# Patient Record
Sex: FEMALE | Race: ASIAN
[De-identification: names, ages, dates, MRNs, and addresses within clinical notes are randomized per-mention and may not be internally consistent; named-entity substitution may affect disease eponyms.]

---

## 2017-11-15 ENCOUNTER — HOSPITAL ENCOUNTER (EMERGENCY)
Dept: HOSPITAL 74 - JER | Age: 82
Discharge: HOME | End: 2017-11-15
Payer: COMMERCIAL

## 2017-11-15 VITALS — HEART RATE: 95 BPM | SYSTOLIC BLOOD PRESSURE: 167 MMHG | TEMPERATURE: 98.2 F | DIASTOLIC BLOOD PRESSURE: 85 MMHG

## 2017-11-15 VITALS — BODY MASS INDEX: 15 KG/M2
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
## 2017-11-15 DIAGNOSIS — S09.90XA: ICD-10-CM
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
## 2017-11-15 DIAGNOSIS — G20: ICD-10-CM

## 2017-11-15 DIAGNOSIS — S01.01XA: Primary | ICD-10-CM

## 2017-11-15 PROCEDURE — 0HQ0XZZ REPAIR SCALP SKIN, EXTERNAL APPROACH: ICD-10-PCS

## 2017-11-15 NOTE — PDOC
Rapid Medical Evaluation


Time Seen by Provider: 11/15/17 18:40


Medical Evaluation: 





11/15/17 18:40





I have performed a brief in-person evaluation of this patient.


The patient presents with a chief complaint of: Slipped and fell hitting head 

tonight, not on any blood thinners. No LOC, HA, dizziness, n/v. Also c/o b/l 

hip pain and has not attempted to ambulate since injury. H/o PD, ambulates w/ 

walker


Pertinent physical exam findings:Stable and in NAD w/ dried matted blood to 

occipital area, no obvious LE deformity, non-focal


I have ordered the following:CT head and hip xrays


The patient will proceed to the ED for further evaluation.

## 2017-11-15 NOTE — PDOC
History of Present Illness





- General


History Source: Patient, Family (Son), Significant Other ()





- History of Present Illness


Initial Comments: 





11/15/17 20:45


The patient is a 79 year old female, with a significant past medical history of 

Parkinsons Disease, who presents to the emergency department s/p unwitnessed 

fall today. Patient was reportedly in the kitchen and fell backwards landing on 

the floor. Patients  and son at bedside report the patient hit the back 

of her head. Upon evaluation, patient reports pain to legs. Patient denies any 

nausea, vomiting or headache. 





She denies chest pain, SOB or dizziness. She denies fever, chills, abdominal 

pain, nausea, vomit, diarrhea or constipation. She denies dysuria, frequency, 

urgency or hematuria. Patient denies sick contacts or recent travel. 





Allergies: NKA


Past surgical history: None


Social history:  None


PCP: Dr. DENNIS Alejandro 











<Kristin Gonzalez - Last Filed: 11/15/17 23:06>





<Brent Denis - Last Filed: 11/15/17 23:11>





- General


Chief Complaint: Injury


Stated Complaint: FALL INJURY


Time Seen by Provider: 11/15/17 18:40





Past History





<Kristin Gonzalez - Last Filed: 11/15/17 23:06>





- Past Medical History


COPD: No


Other medical history: PARKINSONS





- Suicide/Smoking/Psychosocial Hx


Smoking History: Never smoked


Hx Alcohol Use: No


Drug/Substance Use Hx: No





<Brent Denis - Last Filed: 11/15/17 23:11>





- Past Medical History


Allergies/Adverse Reactions: 


 Allergies











Allergy/AdvReac Type Severity Reaction Status Date / Time


 


No Known Allergies Allergy   Verified 11/15/17 18:44














**Review of Systems





- Review of Systems


Able to Perform ROS?: Yes


Comments:: 





11/15/17 20:45





CONSTITUTIONAL: No fever, no chills, no fatigue


EYES: No visual changes


ENT: No ear pain, no sore throat


CARDIOVASCULAR: No chest pain, no palpitations


RESPIRATORY: No cough, no SOB


GI: No abdominal pain, no nausea, no vomiting, no constipation, no diarrhea


GENITOURINARY: No dysuria, no frequency, no hematuria


MUSKULOSKELETAL: No back pain, no joint pain, no myalgias


SKIN: +head laceration. No rash


NEURO: No headache











<Kristin Gonzalez - Last Filed: 11/15/17 23:06>





*Physical Exam





- Vital Signs


 Last Vital Signs











Temp Pulse Resp BP Pulse Ox


 


 98.2 F   95 H  20   167/85   95 


 


 11/15/17 18:40  11/15/17 18:40  11/15/17 18:40  11/15/17 18:40  11/15/17 18:40














- Physical Exam


Comments: 





11/15/17 20:45





CONSTITUTIONAL: Well-appearing; well-nourished; in no apparent distress


HEAD: Normocephalic; +0.5 laceration to the back of head. 


EYES: PERRL; EOM intact. +Limited upward gaze


ENMT: External appears normal; normal oropharynx


NECK: Supple; nontender; no cervical lymphadenopathy


CARD: Normal S1, S2; no murmurs, rubs, or gallops


RESP: Normal chest excursion with respiration; breath sounds clear and equal 

bilaterally; no wheezes, rhonchi, or rales


ABD: Soft, non-distended; non-tender; no palpable organomegaly, no palpable 

hernias


EXT: Normal ROM in all four extremities; non-tender to palpation; distal pulses 

intact. No bony crepitus. No step offs.


SKIN: Warm, dry, no rash


NEURO: No focal neurological deficiencies.+Resting tremor with co-glial 

rigidity. 











<Kristin Gonzalez - Last Filed: 11/15/17 23:06>





- Vital Signs


 Last Vital Signs











Temp Pulse Resp BP Pulse Ox


 


 98.2 F   95 H  20   167/85   95 


 


 11/15/17 18:40  11/15/17 18:40  11/15/17 18:40  11/15/17 18:40  11/15/17 18:40














<Brent Denis - Last Filed: 11/15/17 23:11>





Procedures





- Laceration/Wound Repair


  ** Posterior Occipital


Wound Length: to 2.5 cm


Wound Explored: clean


Wound's Depth, Shape: superficial


Irrigated w/ Saline: Yes


Betadine Prep: No


Wound Debrided: minimal


Wound Repaired With: Staples


Number of Sutures: 3


Sterile Dressing Applied: No


Splint Applied: No


Sling Applied: No


Progress: 





11/15/17 23:10


pt susannah procedure well





<Brent Denis - Last Filed: 11/15/17 23:11>





ED Treatment Course





- RADIOLOGY


Radiology Studies Ordered: 














 Category Date Time Status


 


 CERVICAL SPINE CT W/O CONTR [CT] Stat CT Scan  11/15/17 20:08 Taken














<Brent Denis - Last Filed: 11/15/17 23:11>





Medical Decision Making





- Medical Decision Making





11/15/17 23:07


Patient is a 79-year-old female with history of Parkinson's disease who 

presented to the ER with a small occipital scalp laceration after mechanical 

fall at home. No LOC was reported. CT of head and cervical spine reveals no 

evidence of acute traumatic pathology. Thyroid nodule was noted. Patient's 

pelvic and hip x-rays also revealed no evidence of fracture dislocation. LS-

spine x-ray revealed a compression fracture of L1 which is likely chronic in 

nature as patient has no L1 tenderness and evaluation. I discussed the x-ray 

and CT findings with patient's family. And advised him of the need for 

outpatient follow-up. They've expressed understanding.


11/15/17 23:08


Laceration was repaired primarily using 3 staples. Patient tolerated procedure 

well. Bacitracin applied topically.





<Brent Denis - Last Filed: 11/15/17 23:11>





*DC/Admit/Observation/Transfer





- Attestations


Scribe Attestion: 





11/15/17 20:45





Documentation prepared by Kristin Gonzalez, acting as medical scribe for Brent Denis MD





<Kristin Gonzalez - Last Filed: 11/15/17 23:06>





- Attestations


Physician Attestion: 





11/15/17 23:07


The documentation was prepared by the scribe under my direct supervision. I 

have reviewed the documentation which correctly represents the findings, 

medical decision-making and critical action taken by me.





<Brent Denis - Last Filed: 11/15/17 23:11>


Diagnosis at time of Disposition: 


Occipital scalp laceration


Qualifiers:


 Encounter type: initial encounter Qualified Code(s): S01.01XA - Laceration 

without foreign body of scalp, initial encounter





Head injury due to trauma


Qualifiers:


 Encounter type: initial encounter Qualified Code(s): S09.90XA - Unspecified 

injury of head, initial encounter








- Discharge Dispostion


Disposition: HOME


Condition at time of disposition: Stable





- Referrals


Referrals: 


Cliff Alejandro MD [Primary Care Provider] - 





- Patient Instructions


Printed Discharge Instructions:  DI for Closed Head Injury, DI for Laceration 

Repair -- [facility]


Additional Instructions: 


Please follow-up with your primary care physician for evaluation of thyroid 

nodule and the evaluation of the compression fracture of lumbar vertebra L1. 

Return immediately for severe pain, worsening symptoms.





- Post Discharge Activity

## 2017-11-26 ENCOUNTER — HOSPITAL ENCOUNTER (EMERGENCY)
Dept: HOSPITAL 74 - JERFT | Age: 82
Discharge: HOME | End: 2017-11-26
Payer: COMMERCIAL

## 2017-11-26 VITALS — BODY MASS INDEX: 15 KG/M2

## 2017-11-26 VITALS — HEART RATE: 102 BPM | SYSTOLIC BLOOD PRESSURE: 126 MMHG | TEMPERATURE: 98.1 F | DIASTOLIC BLOOD PRESSURE: 70 MMHG

## 2017-11-26 DIAGNOSIS — Z48.02: Primary | ICD-10-CM

## 2017-11-26 NOTE — PDOC
Suture Removal/Wound Check HPI





- History of Present Illness


Chief Complaint: Suture/Staple Removal(Here)


Stated Complaint: SUTURE/STAPLE REMOVAL


Time Seen by Provider: 11/26/17 11:29


History Source: Yes: Patient, Family


Exam Limitations: Yes: No Limitations


Treated at: [facility] ED





- Previous ED Treatment


Type of procedure performed on last visit: Yes: Laceration Repair


Tetanus Immunization: Yes: Up to Date


Antibiotics Prescribed: No





Past History





- Travel


Traveled outside of the country in the last 30 days: No


Close contact w/someone who was outside of country & ill: No





- Past Medical History


Allergies/Adverse Reactions: 


 Allergies











Allergy/AdvReac Type Severity Reaction Status Date / Time


 


No Known Allergies Allergy   Verified 11/26/17 11:12











CVA: No


COPD: No





- Immunization History


Immunization Up to Date: Yes





- Suicide/Smoking/Psychosocial Hx


Smoking History: Never smoked


Have you smoked in the past 12 months: No


Information on smoking cessation initiated: No


Hx Alcohol Use: No


Drug/Substance Use Hx: No


Substance Use Type: None





Suture Removal/Wound Check PE





- Physical Exam


Laceration/Wound Check Symptoms: reports: None


Current Severity Level: None


Maximum Severity Level: None


Location of Laceration/Wound: right: Head (occiput- 3 staples intact - wound 

approx well)





*Review of Systems





- Review of Systems


Able to Perform ROS?: Yes


Constitutional: Yes: See HPI.  No: Symptoms Reported


HEENTM: Yes: See HPI.  No: Symptoms Reported


Integumentary: Yes: Symptoms Reported, See HPI, Bruising


All Other Systems: Reviewed and Negative





Medical Decision Making





- Medical Decision Making





11/26/17 12:32


3 staples removed wihtout incident 





*DC/Admit/Observation/Transfer


Diagnosis at time of Disposition: 


 Encounter for staple removal








- Discharge Dispostion


Disposition: HOME


Condition at time of disposition: Stable


Admit: No





- Referrals


Referrals: 


Cliff Alejandro MD [Primary Care Provider] - 





- Patient Instructions





- Post Discharge Activity

## 2018-09-19 ENCOUNTER — HOSPITAL ENCOUNTER (EMERGENCY)
Dept: HOSPITAL 74 - JER | Age: 83
Discharge: ANOTHER HOSPITAL | End: 2018-09-19
Payer: COMMERCIAL

## 2018-09-19 VITALS — BODY MASS INDEX: 21.5 KG/M2

## 2018-09-19 VITALS — TEMPERATURE: 97.9 F

## 2018-09-19 VITALS — DIASTOLIC BLOOD PRESSURE: 115 MMHG | HEART RATE: 97 BPM | SYSTOLIC BLOOD PRESSURE: 183 MMHG

## 2018-09-19 DIAGNOSIS — I61.9: Primary | ICD-10-CM

## 2018-09-19 DIAGNOSIS — G20: ICD-10-CM

## 2018-09-19 DIAGNOSIS — R55: ICD-10-CM

## 2018-09-19 LAB
ALBUMIN SERPL-MCNC: 3.4 G/DL (ref 3.4–5)
ALP SERPL-CCNC: 82 U/L (ref 45–117)
ALT SERPL-CCNC: 8 U/L (ref 13–61)
ANION GAP SERPL CALC-SCNC: 12 MMOL/L (ref 8–16)
AST SERPL-CCNC: 18 U/L (ref 15–37)
BASOPHILS # BLD: 1 % (ref 0–2)
BILIRUB SERPL-MCNC: 0.7 MG/DL (ref 0.2–1)
BUN SERPL-MCNC: 26 MG/DL (ref 7–18)
CALCIUM SERPL-MCNC: 8.7 MG/DL (ref 8.5–10.1)
CHLORIDE SERPL-SCNC: 106 MMOL/L (ref 98–107)
CHOLEST SERPL-MCNC: 220 MG/DL (ref 50–200)
CO2 SERPL-SCNC: 24 MMOL/L (ref 21–32)
CREAT SERPL-MCNC: 1 MG/DL (ref 0.55–1.3)
DEPRECATED RDW RBC AUTO: 15.2 % (ref 11.6–15.6)
EOSINOPHIL # BLD: 1.3 % (ref 0–4.5)
GLUCOSE SERPL-MCNC: 118 MG/DL (ref 74–106)
HCT VFR BLD CALC: 40.2 % (ref 32.4–45.2)
HDLC SERPL-MCNC: 68 MG/DL (ref 40–60)
HGB BLD-MCNC: 13.3 GM/DL (ref 10.7–15.3)
INR BLD: 1.04 (ref 0.83–1.09)
LYMPHOCYTES # BLD: 11.1 % (ref 8–40)
MCH RBC QN AUTO: 30.4 PG (ref 25.7–33.7)
MCHC RBC AUTO-ENTMCNC: 33 G/DL (ref 32–36)
MCV RBC: 92 FL (ref 80–96)
MONOCYTES # BLD AUTO: 8.6 % (ref 3.8–10.2)
NEUTROPHILS # BLD: 78 % (ref 42.8–82.8)
PLATELET # BLD AUTO: 467 K/MM3 (ref 134–434)
PMV BLD: 8.1 FL (ref 7.5–11.1)
POTASSIUM SERPLBLD-SCNC: 4.2 MMOL/L (ref 3.5–5.1)
PROT SERPL-MCNC: 7.3 G/DL (ref 6.4–8.2)
PT PNL PPP: 11.8 SEC (ref 9.7–13)
RBC # BLD AUTO: 4.37 M/MM3 (ref 3.6–5.2)
SODIUM SERPL-SCNC: 142 MMOL/L (ref 136–145)
TRIGL SERPL-MCNC: 107 MG/DL (ref 0–150)
WBC # BLD AUTO: 6.3 K/MM3 (ref 4–10)

## 2018-09-19 PROCEDURE — 3E033GC INTRODUCTION OF OTHER THERAPEUTIC SUBSTANCE INTO PERIPHERAL VEIN, PERCUTANEOUS APPROACH: ICD-10-PCS

## 2018-09-19 PROCEDURE — 0BH17EZ INSERTION OF ENDOTRACHEAL AIRWAY INTO TRACHEA, VIA NATURAL OR ARTIFICIAL OPENING: ICD-10-PCS

## 2018-09-19 NOTE — PDOC
Attending Attestation





- Medical Decision Making





09/19/18 


Patient is a full code status as per son and . 


10:00pm 


Call placed to [facility] for transfer. Case discussed with Dr. Crump from the stroke team. Case was accepted. 





10:30pm


Call from Dr. Campos [facility] ER attending, case was discussed and accepted. 


10:30pm


Call placed to Dr. Ventura, neurologist on call, case was discussed.


09/19/18 23:40


Patient left facility via [facility].











<Garth Gamboa - Last Filed: 09/19/18 23:40>





- Resident


Resident Name: Jermaine Campbell





- ED Attending Attestation


I have performed the following: I have examined & evaluated the patient, The 

case was reviewed & discussed with the resident, I agree w/resident's findings 

& plan, Exceptions are as noted





- HPI


HPI: 





09/19/18 23:04


The patient is a 83 year old female, with a significant past medical history of 

Parkinsons Disease, who presents to the emergency department s/p seizure via 

EMS unresponsive. As per EMS, her last known normal was 21:45. She was sitting 

watching television when she began to have full body convulsions. EMS reported 

to the scene in 10 min and she was still conversing. She was given 5mg of IM 

Versed and stopped convulsing. En route she was unresponsive, blood pressure of 

90/62, heart rate of 120, and given fluids. EMS reports she was unresponsive to 

any verbal stimuli, only moaning, and defecated on herself en route. Upon 

arrival the patient was not convulsing and was unresponsive. While in the ED, 

she withdraws from painful stimuli on the LLE and does not respond to any 

painful stimuli on the bilateral UE and RLE. CTh with While in the ED, the 

patient was given 10mg of Etomidate and 50mg Rocuronium at 10:15pm. The patient 

was intubated at 10:21pm. Patient is a full code status. At baseline, the 

patient is alert and oriented to person and ambulates on her own. 





Allergies: NKA 


Past surgical history: None reported.


Social history: Nonsmoker. Denies EtOH use and recreational drug use. 


Primary Care Physician: Dr. Pandya 








- Physicial Exam


PE: 





09/19/18 23:33


GENERAL: unresponsive


HEAD: No signs of trauma


EYES: PERRLA 3->2, pupils midline, sclera clear


ENT: Moist mucosa


NECK: supple


LUNGS: Breath sounds equal, clear to auscultation bilaterally.  No wheezes, and 

no crackles


HEART: Regular rate and rhythm, normal S1 and S2, no murmurs, rubs or gallops


ABDOMEN: Soft, nontender, normoactive bowel sounds.  No guarding, no rebound.  

No masses


EXTREMITIES: WWP, 2+ peripheral pulses


NEUROLOGICAL:  aphasic, LLE withdraws to pain, flaccid RUE and RLE. 


SKIN: Warm, Dry, normal turgor, no rashes or lesions noted.





- Critical Care Time


Total Critical Care Time: 90


Critical Care Statement: The care of this patient involved high complexity 

decision making to prevent further life threatening deterioration of the patient

's condition and/or to evaluate & treat vital organ system(s) failure or risk 

of failure.





- Medical Decision Making








09/19/18 23:36


82yo F hx parkinsons disease presents to the ED with seizure followed by 

unresponsiveness post versed. Code gray activated. +L frontal hemorrhagic bleed 

with no evidence of edema/herniation. Pt intubated via DL with 7.0 tube with no 

complications. PT accepted for transfer by Dr. Michaels (neuro at [facility]) and 

Dr. Campos ([facility] ED). Fentanyl initiated for sedation. BP elevated to 160s, HR 

99, pt given labetalol 10mg IV push prior to transfer. 





<Mercedez Christine - Last Filed: 09/19/18 23:55>





**Heart Score/ECG Review


  ** #1





09/19/18 23:39


Sinus rhythm, rate 80 to. Normal axis.+ PVCs and PACs. Lateral ST depressions, 

no sig REAGAN





<Mercedez Christine - Last Filed: 09/19/18 23:55>





Attestations





- Attestations





09/19/18 22:46





Documentation prepared by Garth Gamboa, acting as medical scribe for 

Mercedez Christine MD.





<Garth Gamboa - Last Filed: 09/19/18 23:40>





Intubation





- Intubation


Reason for Intubation: Airway Protection


Intubation Method: orotracheal


Blade used: Mac


Tube Size (cm): 7.0


Tube position @ lip (cm): 18


Tube position confirmed by: Direct visualization, Chest x-ray, Breath sounds


Breath Sounds after Intubation: equal


Post Intubation Xray: Yes





<Mercedez Christine - Last Filed: 09/19/18 23:55>

## 2018-09-19 NOTE — PDOC
History of Present Illness





- General


Stated Complaint: POSSIBLE STROKE


Time Seen by Provider: 09/19/18 21:38


History Source: Family


Exam Limitations: No Limitations





- History of Present Illness


Initial Comments: 





09/19/18 22:48


84 yo female pmh of Parkinson presents to the ED via EMS only responsive to 

pain on the left leg and witnessed by son full body convulsion at 21: 49 while 

watching TV. Pt received 5mg of IM Versed by EMS and stopped convulsing. En 

route she was unresponsive, blood pressure of 90/62, heart rate of 120, and 

given fluids. In the ED, she responds to painful stimuli on the LLE. 











NIH Stroke Scale





- Last Known Well Date/Time & Onset


Date Last Known Well: 09/19/18


Time Last Known Well: 21:49





- Initial Evaluation


Level of consciousness: Not alert, requires repeat stimulation to attend


Ask patient the month and their age: Both incorrect


Ask patient to open & close eyes; make fist and let go: Both incorrect


Best gaze (horizontal eye movement): Forced deviation


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


Facial paresis (Show teeth/raise eyebrows/close eyes tight): Complete paralysis 

of one or both sides (Upper and lower face)


Motor Function: Left Arm: No effort against gravity


Motor Function:  Right Arm: No movement


Motor Function:  Left Leg: Some effort against gravity


Motor Function:  Right Leg: No movement


Limb Ataxia: Untestable (Joint fused or limb amputated), explain:


Sensory(Use pinprick test arms,legs,trunk,face/side to side): Severe to total 

sensory loss


Best language (Describe picture, name items, read sentences): Mute


Dysarthria (read several words): Near unintelligible or unable to speak


Extinction and Inattention: Profound tabitha-inattention or extinction to more 

than one modality





- Total Score


NIH Stroke Scale Score: 36





Past History





- Past Medical History


Allergies/Adverse Reactions: 


 Allergies











Allergy/AdvReac Type Severity Reaction Status Date / Time


 


No Allergy Information Allergy   Verified 09/19/18 22:38





Available     











Home Medications: 


Ambulatory Orders





Unobtainable  09/19/18 








COPD:  (unable to obtain)





- Suicide/Smoking/Psychosocial Hx


Smoking History: Unknown if ever smoked


Substance Use Type: None





**Review of Systems





- Review of Systems


Able to Perform ROS?: No (unreponsive)





*Physical Exam





- Vital Signs


 Last Vital Signs











Temp Pulse Resp BP Pulse Ox


 


    103 H  14   106/70   96 


 


    09/19/18 21:57  09/19/18 21:57  09/19/18 21:57  09/19/18 22:03














- Physical Exam


General Appearance: Yes: Apparent Distress, Other (comatose)


HEENT: positive: RAISSA, Other (pupils reactive bilaterally)


Neck: positive: Tender


Respiratory/Chest: positive: Lungs Clear


Cardiovascular: positive: Regular Rhythm, S1, S2, Tachycardia.  negative: Edema

, JVD, Murmur


Gastrointestinal/Abdominal: positive: Normal Bowel Sounds


Integumentary: positive: Normal Color


Neurologic: negative: Alert (responds only to pain on left lower leg ), Facial 

Droop





Procedures





- Intubation


Intubation Method: orotracheal


Blade used: Mac


Tube Size (Fr): 7.0


Medications: Etomidate (10mg), Rocuronium (50mg)


Tube position @ lip (cm): 18


Tube position confirmed by: Direct visualization, CO2 detector, Chest x-ray, 

Breath sounds


Breath Sounds after Intubation: equal


Intubation Complications: no complications


Post Intubation Xray: Yes





ED Treatment Course





- LABORATORY


CBC & Chemistry Diagram: 


 09/19/18 21:56





 09/19/18 21:56





- ADDITIONAL ORDERS


Additional order review: 


 Laboratory  Results











  09/19/18





  21:56


 


PT with INR  11.80


 


INR  1.04








 











  09/19/18





  21:56


 


RBC  4.37


 


MCV  92.0


 


MCHC  33.0


 


RDW  15.2


 


MPV  8.1


 


Neutrophils %  78.0


 


Lymphocytes %  11.1


 


Monocytes %  8.6


 


Eosinophils %  1.3


 


Basophils %  1.0














- RADIOLOGY


Radiology Studies Ordered: 














 Category Date Time Status


 


 CHEST X-RAY PORTABLE* [RAD] Stat Radiology  09/19/18 22:28 Ordered














Medical Decision Making





- Medical Decision Making





09/19/18 22:40


84 yo female pmh of Parkinson presents to the ED via EMS only responsive to 

pain on the left leg and witnessed by son full body convulsion at 21: 49. 





GCS 4 


Head CT shows cerebral bleed 





Patient intubated 10:15 pm etom (10mg) 10:16 valentina (50mg).


Tube size 7.0, Mac blade 4, cords visualized and tube seen going through cords 

. Bilateral breath sounds noted and tube confirmed with chest x ray and cap.





Transfering intubated patient to [facility]














*DC/Admit/Observation/Transfer


Diagnosis at time of Disposition: 


 Cerebral hemorrhage








- Discharge Dispostion


Disposition: TRANSFER ACUTE CARE/OTHER HOSP


Condition at time of disposition: Stable


Decision to Admit order: No





- Referrals





- Patient Instructions





- Post Discharge Activity

## 2018-09-22 NOTE — EKG
Test Reason : 

Blood Pressure : ***/*** mmHG

Vent. Rate : 082 BPM     Atrial Rate : 082 BPM

   P-R Int : 176 ms          QRS Dur : 084 ms

    QT Int : 442 ms       P-R-T Axes : 072 053 -72 degrees

   QTc Int : 516 ms

 

SINUS RHYTHM WITH OCCASIONAL PREMATURE VENTRICULAR COMPLEXES AND 

PREMATURE ATRIAL COMPLEXES



PROLONGED QT

ABNORMAL ECG

NO PREVIOUS ECGS AVAILABLE

Confirmed by MD Benoit Daniel ([id number]) on 9/22/2018 3:39:35 PM

 

Referred By:             Confirmed By:Dustin Benoit MD